# Patient Record
Sex: FEMALE | Race: BLACK OR AFRICAN AMERICAN | URBAN - METROPOLITAN AREA
[De-identification: names, ages, dates, MRNs, and addresses within clinical notes are randomized per-mention and may not be internally consistent; named-entity substitution may affect disease eponyms.]

---

## 2022-12-20 ENCOUNTER — OFFICE VISIT (OUTPATIENT)
Facility: LOCATION | Age: 57
End: 2022-12-20
Payer: COMMERCIAL

## 2022-12-20 DIAGNOSIS — D48.1 NEOPLASM OF UNCERTAIN BEHAVIOR OF CONNECTIVE TISSUE OF EYELID: Primary | ICD-10-CM

## 2022-12-20 PROCEDURE — 99203 OFFICE O/P NEW LOW 30 MIN: CPT | Performed by: OPHTHALMOLOGY

## 2022-12-20 ASSESSMENT — INTRAOCULAR PRESSURE
OS: 14
OD: 14

## 2022-12-20 NOTE — IMPRESSION/PLAN
Impression: Neoplasm of uncertain behavior of connective tissue of eyelid: D48.1. Plan: Patient examined. Chart reviewed. Patient has a neoplasm (RLL [id number] & 2mm-milia),  and surgical excision is advised due to atypical changes. Discussed benefits and risk. Patient wishes to proceed with surgery. 

RV 1-3 weeks for Office Procedure ([id number])

## 2023-01-04 ENCOUNTER — PROCEDURE (OUTPATIENT)
Facility: LOCATION | Age: 58
End: 2023-01-04
Payer: COMMERCIAL

## 2023-01-04 DIAGNOSIS — D48.1 NEOPLASM OF UNCERTAIN BEHAVIOR OF CONNECTIVE TISSUE OF EYELID: Primary | ICD-10-CM

## 2023-01-04 DIAGNOSIS — H00.12 CHALAZION RIGHT LOWER EYELID: ICD-10-CM

## 2023-01-04 PROCEDURE — 67800 REMOVE EYELID LESION: CPT | Performed by: OPHTHALMOLOGY

## 2023-01-04 PROCEDURE — 67840 REMOVE EYELID LESION: CPT | Performed by: OPHTHALMOLOGY

## 2023-01-04 RX ORDER — NEOMYCIN, POLYMYXIN B SULFATES, DEXAMETHASONE 1; 3.5; 1 MG/G; MG/G; [USP'U]/G
OINTMENT OPHTHALMIC
Qty: 3.5 | Refills: 0 | Status: ACTIVE
Start: 2023-01-04

## 2023-01-04 ASSESSMENT — INTRAOCULAR PRESSURE
OD: 14
OS: 14

## 2023-01-04 NOTE — IMPRESSION/PLAN
Impression: Neoplasm of uncertain behavior of connective tissue of eyelid: D48.1. Plan: Patient examined. Chart reviewed. Patient has a neoplasm (RLL [id number] & 2mm-milia), and surgical excision is advised due to atypical changes. Discussed benefits and risk. Patient wishes to proceed with surgery. RX'd Bayron/Poy/Dex TID RLL

RV PRN

## 2023-01-04 NOTE — IMPRESSION/PLAN
Impression: Chalazion right lower eyelid: H00.12. Plan: Patient examined. Chart reviewed. 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 This was diagrammatically described. Patient voiced understanding. Discussed known options for management (do nothing, conservative management, steroid injection where appropriate,  and/or surgical intervention.) Risks discussed: bleeding, infection, dry eye, asymmetry, numbness, and/or need for further surgery. Patient elects surgical intervention at this time.  

19434 - Excision of chalazion

## 2023-01-19 ENCOUNTER — OFFICE VISIT (OUTPATIENT)
Dept: URBAN - METROPOLITAN AREA CLINIC 33 | Facility: CLINIC | Age: 58
End: 2023-01-19
Payer: COMMERCIAL

## 2023-01-19 DIAGNOSIS — Z98.890 OTHER SPECIFIED POSTPROCEDURAL STATES: Primary | ICD-10-CM

## 2023-01-19 PROCEDURE — 99024 POSTOP FOLLOW-UP VISIT: CPT | Performed by: OPHTHALMOLOGY

## 2023-01-19 ASSESSMENT — INTRAOCULAR PRESSURE
OD: 14
OS: 14

## 2023-01-19 NOTE — IMPRESSION/PLAN
Impression: Other specified postprocedural states: Z98.890. Plan: s/p excision of adela plasma of uncertain behavior of tissue eye lid(s)/chalazion. healing well, no sign of infection. 3 sutures removed in office today.  
RTC PRN

## 2023-06-27 ENCOUNTER — OFFICE VISIT (OUTPATIENT)
Facility: LOCATION | Age: 58
End: 2023-06-27
Payer: COMMERCIAL

## 2023-06-27 DIAGNOSIS — H52.4 PRESBYOPIA: Primary | ICD-10-CM

## 2023-06-27 DIAGNOSIS — H25.13 AGE-RELATED NUCLEAR CATARACT, BILATERAL: ICD-10-CM

## 2023-06-27 PROCEDURE — 92014 COMPRE OPH EXAM EST PT 1/>: CPT | Performed by: OPTOMETRIST

## 2023-06-27 ASSESSMENT — INTRAOCULAR PRESSURE
OD: 12
OS: 12

## 2023-06-27 ASSESSMENT — VISUAL ACUITY
OD: 20/20
OS: 20/20

## 2023-06-27 NOTE — IMPRESSION/PLAN
Impression: Presbyopia: H52.4. Plan: Glasses prescription helps improve vision. Provided patient with three Rx's per request- A progressive Rx, a SV reading Rx, and a SV computer Rx. All of the glasses Rx's were provided as requested.

## 2024-02-13 ENCOUNTER — OFFICE VISIT (OUTPATIENT)
Facility: LOCATION | Age: 59
End: 2024-02-13
Payer: COMMERCIAL

## 2024-02-13 DIAGNOSIS — H25.13 AGE-RELATED NUCLEAR CATARACT, BILATERAL: Primary | ICD-10-CM

## 2024-02-13 DIAGNOSIS — H52.4 PRESBYOPIA: ICD-10-CM

## 2024-02-13 PROCEDURE — 92014 COMPRE OPH EXAM EST PT 1/>: CPT | Performed by: OPHTHALMOLOGY

## 2024-02-13 ASSESSMENT — VISUAL ACUITY
OD: 20/20
OS: 20/20

## 2024-02-13 ASSESSMENT — KERATOMETRY
OS: 42.25
OD: 43.00

## 2024-02-13 ASSESSMENT — INTRAOCULAR PRESSURE
OD: 14
OS: 14